# Patient Record
Sex: MALE | Race: BLACK OR AFRICAN AMERICAN | Employment: FULL TIME | URBAN - METROPOLITAN AREA
[De-identification: names, ages, dates, MRNs, and addresses within clinical notes are randomized per-mention and may not be internally consistent; named-entity substitution may affect disease eponyms.]

---

## 2023-02-28 ENCOUNTER — APPOINTMENT (OUTPATIENT)
Dept: GENERAL RADIOLOGY | Age: 48
End: 2023-02-28
Attending: NURSE PRACTITIONER
Payer: COMMERCIAL

## 2023-02-28 ENCOUNTER — HOSPITAL ENCOUNTER (EMERGENCY)
Age: 48
Discharge: HOME | End: 2023-02-28
Attending: EMERGENCY MEDICINE
Payer: COMMERCIAL

## 2023-02-28 VITALS
BODY MASS INDEX: 27.7 KG/M2 | SYSTOLIC BLOOD PRESSURE: 131 MMHG | HEIGHT: 69 IN | OXYGEN SATURATION: 98 % | WEIGHT: 187 LBS | TEMPERATURE: 98.4 F | HEART RATE: 85 BPM | DIASTOLIC BLOOD PRESSURE: 78 MMHG | RESPIRATION RATE: 18 BRPM

## 2023-02-28 DIAGNOSIS — M79.671 RIGHT FOOT PAIN: Primary | ICD-10-CM

## 2023-02-28 PROCEDURE — 74011250636 HC RX REV CODE- 250/636: Performed by: NURSE PRACTITIONER

## 2023-02-28 PROCEDURE — 96372 THER/PROPH/DIAG INJ SC/IM: CPT

## 2023-02-28 PROCEDURE — 73630 X-RAY EXAM OF FOOT: CPT

## 2023-02-28 PROCEDURE — 99284 EMERGENCY DEPT VISIT MOD MDM: CPT

## 2023-02-28 RX ORDER — KETOROLAC TROMETHAMINE 30 MG/ML
30 INJECTION, SOLUTION INTRAMUSCULAR; INTRAVENOUS
Status: COMPLETED | OUTPATIENT
Start: 2023-02-28 | End: 2023-02-28

## 2023-02-28 RX ORDER — IBUPROFEN 600 MG/1
600 TABLET ORAL
Qty: 20 TABLET | Refills: 0 | Status: SHIPPED | OUTPATIENT
Start: 2023-02-28

## 2023-02-28 RX ADMIN — KETOROLAC TROMETHAMINE 30 MG: 30 INJECTION, SOLUTION INTRAMUSCULAR; INTRAVENOUS at 12:40

## 2023-02-28 NOTE — ED PROVIDER NOTES
This is a 49-year-old female who presents ambulatory to the emergency room with complaints of right foot pain. Patient states yesterday he was attempting to bring groceries and when he missed the step and hit the ball of his foot on a wet stair causing immediate pain. Patient states he went to bed and when he woke up this morning he has been unable to weight-bear. States the pain is 10 out of 10 located at the ball of his right foot. Has not taken any medication prior to arrival for symptoms. Did not fall. There are no further complaints. No past medical history on file. No past surgical history on file. No family history on file. Social History     Socioeconomic History    Marital status: SINGLE     Spouse name: Not on file    Number of children: Not on file    Years of education: Not on file    Highest education level: Not on file   Occupational History    Not on file   Tobacco Use    Smoking status: Every Day    Smokeless tobacco: Not on file   Substance and Sexual Activity    Alcohol use: No    Drug use: Not on file    Sexual activity: Not on file   Other Topics Concern    Not on file   Social History Narrative    Not on file     Social Determinants of Health     Financial Resource Strain: Not on file   Food Insecurity: Not on file   Transportation Needs: Not on file   Physical Activity: Not on file   Stress: Not on file   Social Connections: Not on file   Intimate Partner Violence: Not on file   Housing Stability: Not on file         ALLERGIES: Patient has no known allergies. Review of Systems   Constitutional:  Negative for activity change, chills, fatigue and fever. HENT:  Negative for congestion and trouble swallowing. Eyes:  Negative for photophobia and redness. Respiratory:  Negative for cough and shortness of breath. Cardiovascular:  Negative for chest pain. Gastrointestinal:  Negative for abdominal distention, nausea and vomiting. Endocrine: Negative. Genitourinary:  Negative for difficulty urinating, frequency and urgency. Musculoskeletal:  Positive for arthralgias (right foot). Negative for back pain, neck pain and neck stiffness. Skin:  Negative for color change, pallor, rash and wound. Allergic/Immunologic: Negative. Neurological:  Negative for dizziness, syncope, weakness and headaches. Hematological:  Does not bruise/bleed easily. Psychiatric/Behavioral:  Negative for behavioral problems. The patient is not nervous/anxious. Vitals:    02/28/23 1032   BP: (!) 146/105   Pulse: 85   Resp: 18   Temp: 98.4 °F (36.9 °C)   SpO2: 98%   Weight: 84.8 kg (187 lb)   Height: 5' 9\" (1.753 m)            Physical Exam  Vitals and nursing note reviewed. Constitutional:       General: He is not in acute distress. Appearance: Normal appearance. He is well-developed. He is not ill-appearing. HENT:      Head: Normocephalic and atraumatic. Right Ear: External ear normal.      Left Ear: External ear normal.      Nose: Nose normal. No congestion. Mouth/Throat:      Mouth: Mucous membranes are moist.   Eyes:      General:         Right eye: No discharge. Left eye: No discharge. Conjunctiva/sclera: Conjunctivae normal.      Pupils: Pupils are equal, round, and reactive to light. Neck:      Vascular: No JVD. Trachea: No tracheal deviation. Cardiovascular:      Rate and Rhythm: Normal rate. Pulses: Normal pulses. Heart sounds: No murmur heard. No gallop. Pulmonary:      Effort: Pulmonary effort is normal. No respiratory distress. Abdominal:      General: There is no distension. Tenderness: There is no guarding. Genitourinary:     Comments: Negative    Musculoskeletal:         General: Tenderness (right plantar portion of foot with pain.) present. Normal range of motion. Cervical back: Normal range of motion and neck supple. No tenderness. Skin:     General: Skin is warm and dry.       Capillary Refill: Capillary refill takes less than 2 seconds. Coloration: Skin is not pale. Findings: No erythema or rash. Neurological:      General: No focal deficit present. Mental Status: He is alert and oriented to person, place, and time. Motor: No weakness. Coordination: Coordination normal.   Psychiatric:         Mood and Affect: Mood normal.         Behavior: Behavior normal.         Thought Content: Thought content normal.         Judgment: Judgment normal.        Medical Decision Making  This is a 39-year-old male who presents ambulatory to the emergency room with complaints of right foot pain, plantar portion of the ball of his foot. States he was carrying groceries and when his foot slipped off a wet step causing immediate pain. Went to sleep, elevated his foot. Woke up this morning and is unable to weight-bear secondary to the increased pain. Denies any loss of motor or sensation. Differential diagnosis includes foot sprain, metatarsal fracture, musculoskeletal pain and others. 1. Previous notes (external to Emergency room) were reviewed: Chart review    2. History was obtained by: Patient    3. Chronic medical issues affecting this visit:   No past medical history on file. No past surgical history on file. 4. I ordered the following tests, followed by review of final reads by lab and radiology: Right foot plain film    5. I considered ordering: Ultrasound    6. Medical intervention: none      Surgical intervention: none    7. Social determinants of health: None    8 Final diagnosis: right foot pain. Medications prescribed: ibuprofen    9. Disposition: discharge to home with ace wrap. Follow up with PCP as an outpatient. Ortho if needed. Return to the emergency room with worsening symptoms. Patient in agreement with plan of care.       Problems Addressed:  Right foot pain: acute illness or injury    Amount and/or Complexity of Data Reviewed  External Data Reviewed: notes. Radiology: ordered and independent interpretation performed. Decision-making details documented in ED Course. Risk  Prescription drug management. Labs Reviewed - No data to display    XR FOOT RT MIN 3 V    Result Date: 2/28/2023  No acute abnormality. 12:28 PM  Pt has been reexamined. Pt has no new complaints, changes or physical findings. Care plan outlined and precautions discussed. All available results were reviewed with pt. All medications were reviewed with pt. All of pt's questions and concerns were addressed. Pt agrees to F/U as instructed and agrees to return to ED upon further deterioration. Pt is ready to go home. Jamal Solis NP    Please note that this dictation was completed with [email or web address], the computer voice recognition software. Quite often unanticipated grammatical, syntax, homophones, and other interpretive errors are inadvertently transcribed by the computer software. Please disregard these errors. Please excuse any errors that have escaped final proofreading. Thank you.     Procedures

## 2023-02-28 NOTE — Clinical Note
[address]  [facility] EMERGENCY DEPT  [id number] [phone]    Work/School Note    Date: 2/28/2023    To Whom It May concern:    Omar Hubbard was seen and treated today in the emergency room by the following provider(s):  Attending Provider: Joana Donohue MD  Nurse Practitioner: Cassidy Park NP.      Omar Hubbard is excused from work/school on 02/28/23 and 03/01/23. He is medically clear to return to work/school on 3/2/2023.        Sincerely,          Yevgeniy Barba NP

## 2023-02-28 NOTE — ED TRIAGE NOTES
Patient arrives c/o of R foot pain x1 day after missing a step. No deformity or edema. Ambulatory with limp. Denies taking any OTC pain meds. Denies any other medical problems.

## 2023-05-25 RX ORDER — IBUPROFEN 600 MG/1
600 TABLET ORAL EVERY 6 HOURS PRN
COMMUNITY
Start: 2023-02-28